# Patient Record
Sex: MALE | Race: WHITE | NOT HISPANIC OR LATINO | Employment: FULL TIME | URBAN - METROPOLITAN AREA
[De-identification: names, ages, dates, MRNs, and addresses within clinical notes are randomized per-mention and may not be internally consistent; named-entity substitution may affect disease eponyms.]

---

## 2020-09-01 ENCOUNTER — OFFICE VISIT (OUTPATIENT)
Dept: URGENT CARE | Facility: CLINIC | Age: 35
End: 2020-09-01
Payer: COMMERCIAL

## 2020-09-01 VITALS
OXYGEN SATURATION: 100 % | RESPIRATION RATE: 18 BRPM | WEIGHT: 160 LBS | TEMPERATURE: 97 F | HEART RATE: 75 BPM | BODY MASS INDEX: 21.67 KG/M2 | HEIGHT: 72 IN

## 2020-09-01 DIAGNOSIS — Z11.59 SCREENING FOR VIRAL DISEASE: Primary | ICD-10-CM

## 2020-09-01 PROCEDURE — 99203 OFFICE O/P NEW LOW 30 MIN: CPT | Performed by: PHYSICIAN ASSISTANT

## 2020-09-01 PROCEDURE — U0003 INFECTIOUS AGENT DETECTION BY NUCLEIC ACID (DNA OR RNA); SEVERE ACUTE RESPIRATORY SYNDROME CORONAVIRUS 2 (SARS-COV-2) (CORONAVIRUS DISEASE [COVID-19]), AMPLIFIED PROBE TECHNIQUE, MAKING USE OF HIGH THROUGHPUT TECHNOLOGIES AS DESCRIBED BY CMS-2020-01-R: HCPCS | Performed by: PHYSICIAN ASSISTANT

## 2020-09-01 NOTE — PROGRESS NOTES
[facility]        NAME: Marley Soriano is a 28 y o  male  : 1985    MRN: [id number]  DATE: 2020  TIME: 8:42 AM    Assessment and Plan   Screening for viral disease [Z11 59]  1  Screening for viral disease  Novel Coronavirus (COVID-19), PCR [facility] - Office Collection       [facility] evaluation and testing performed  Patient Instructions   Covid 19 results will return in a 3-5 days  We will call you with both negative or positive results  Prophylactically self quarantine  Department of health's newest recommendations state patient should self quarantine for 10 days since symptom onset or 3 days fever free without the use of fever reducing drugs (Tylenol and ibuprofen), whichever is longer AND overall improvement of symptoms  Drink lots of fluids to maintain hydration  Do not touch your face, wash hands often, and practice social distancing  Call your PCP if you have any questions or concerns  Go to ER if having severe symptoms such as chest pain, shortness of breath, or fever that is not responding to antipyretics  Follow up with PCP in 3-5 days  Proceed to  ER if symptoms worsen  Chief Complaint     Chief Complaint   Patient presents with    COVID-19     asymptomatic, recent travel, test needed for work         History of Present Illness       Patient is a 70-year-old male with no significant past medical history presents to the office requesting testing for COVID-19 after recent travel to return to work  The patient is currently asymptomatic and denies fever, chills, cough, SOB, CP, difficulty breathing, anosmia, dysgeusia, or weakness  He denies any contact with positive coronavirus persons  Review of Systems   Review of Systems   Constitutional: Negative for chills and fever  HENT: Negative for congestion and sore throat  Respiratory: Negative for cough and shortness of breath  Cardiovascular: Negative for chest pain and palpitations     Gastrointestinal: Negative for abdominal pain, diarrhea, nausea and vomiting  Musculoskeletal: Negative for myalgias  Skin: Negative for rash  Neurological: Negative for dizziness, light-headedness and headaches  Current Medications     No current outpatient medications on file  Current Allergies     Allergies as of 09/01/2020 - Reviewed 09/01/2020   Allergen Reaction Noted    Penicillins Anaphylaxis 09/01/2020            The following portions of the patient's history were reviewed and updated as appropriate: allergies, current medications, past family history, past medical history, past social history, past surgical history and problem list      Past Medical History:   Diagnosis Date    Cancer ([address] )     Pneumonia        Past Surgical History:   Procedure Laterality Date    CHEST SURGERY      SHOULDER SURGERY         No family history on file  Medications have been verified  Objective   Pulse 75   Temp (!) 97 °F (36 1 °C)   Resp 18   Ht 6' (1 829 m)   Wt 72 6 kg (160 lb)   SpO2 100%   BMI 21 70 kg/m²        Physical Exam     Physical Exam  Vitals signs and nursing note reviewed  Constitutional:       Appearance: He is well-developed  HENT:      Head: Normocephalic and atraumatic  Right Ear: External ear normal       Left Ear: External ear normal       Nose: Nose normal       Mouth/Throat:      Pharynx: Uvula midline  Eyes:      General: Lids are normal    Cardiovascular:      Rate and Rhythm: Normal rate and regular rhythm  Heart sounds: Normal heart sounds  No murmur  No friction rub  No gallop  Pulmonary:      Effort: Pulmonary effort is normal       Breath sounds: Normal breath sounds  No stridor  No wheezing or rales  Musculoskeletal: Normal range of motion  Skin:     General: Skin is warm and dry  Capillary Refill: Capillary refill takes less than 2 seconds  Findings: No rash  Neurological:      Mental Status: He is alert

## 2020-09-01 NOTE — PATIENT INSTRUCTIONS
Covid 19 results will return in a 3-5 days  We will call you with both negative or positive results  Prophylactically self quarantine  Department of health's newest recommendations state patient should self quarantine for 10 days since symptom onset or 3 days fever free without the use of fever reducing drugs (Tylenol and ibuprofen), whichever is longer AND overall improvement of symptoms  Drink lots of fluids to maintain hydration  Do not touch your face, wash hands often, and practice social distancing  Call your PCP if you have any questions or concerns  Go to ER if having severe symptoms such as chest pain, shortness of breath, or fever that is not responding to antipyretics  Novel Coronavirus   WHAT YOU NEED TO KNOW:   The nCoV is a new strain (type) of coronavirus that can cause severe respiratory (lung) infection  DISCHARGE INSTRUCTIONS:   Call your local emergency number (911 in the [address] [place] [address],[address]) for any of the following:  Tell the  you may have nCoV  This will help anyone in the ambulance stay safe, and to call ahead to the hospital   · You have sudden shortness of breath  · You have a fast heartbeat or chest pain  Return to the emergency department if:   · You feel so dizzy that you have trouble standing up  · Your lips and fingernails turn blue  Call your doctor if:   · You have a fever over 102ºF (39ºC)  · Your sore throat gets worse or you see white or yellow spots in your throat  · Your symptoms get worse after 3 to 5 days or your cold is not better in 14 days  · You have a rash anywhere on your skin  · You have large, tender lumps in your neck  · You have thick, green, or yellow drainage from your nose  · You cough up thick yellow, green, or bloody mucus  · You are vomiting for more than 24 hours and cannot keep fluids down  · You have a bad earache  · You have questions or concerns about your condition or care  Medicines:   You may need any of the following:  · Decongestants  help reduce nasal congestion and help you breathe more easily  If you take decongestant pills, they may make you feel restless or cause problems with your sleep  Do not use decongestant sprays for more than a few days  · Cough suppressants  help reduce coughing  Ask your healthcare provider which type of cough medicine is best for you  · NSAIDs , such as ibuprofen, help decrease swelling, pain, and fever  NSAIDs can cause stomach bleeding or kidney problems in certain people  If you take blood thinner medicine, always ask your healthcare provider if NSAIDs are safe for you  Always read the medicine label and follow directions  · Acetaminophen  decreases pain and fever  It is available without a doctor's order  Ask how much to take and how often to take it  Follow directions  Read the labels of all other medicines you are using to see if they also contain acetaminophen, or ask your doctor or pharmacist  Acetaminophen can cause liver damage if not taken correctly  Do not use more than 4 grams (4,000 milligrams) total of acetaminophen in one day  · Take your medicine as directed  Contact your healthcare provider if you think your medicine is not helping or if you have side effects  Tell him or her if you are allergic to any medicine  Keep a list of the medicines, vitamins, and herbs you take  Include the amounts, and when and why you take them  Bring the list or the pill bottles to follow-up visits  Carry your medicine list with you in case of an emergency  Help relieve mild symptoms:   · Drink more liquids as directed  Liquids will help thin and loosen mucus so you can cough it up  Liquids will also help prevent dehydration  Liquids that help prevent dehydration include water, fruit juice, and broth  Do not drink liquids that contain caffeine  Caffeine can increase your risk for dehydration  Ask your healthcare provider how much liquid to drink each day      · Soothe a sore throat  Gargle with warm salt water  This helps your sore throat feel better  Make salt water by dissolving ¼ teaspoon salt in 1 cup warm water  You may also suck on hard candy or throat lozenges  You may use a sore throat spray  · Use a humidifier or vaporizer  Use a cool mist humidifier or a vaporizer to increase air moisture in your home  This may make it easier for you to breathe and help decrease your cough  · Use saline nasal drops as directed  These help relieve congestion  · Apply petroleum-based jelly around the outside of your nostrils  This can decrease irritation from blowing your nose  · Do not smoke  Nicotine and other chemicals in cigarettes and cigars can make your symptoms worse  They can also cause infections such as bronchitis or pneumonia  Ask your healthcare provider for information if you currently smoke and need help to quit  E-cigarettes or smokeless tobacco still contain nicotine  Talk to your healthcare provider before you use these products  Prevent the spread of nCoV:  If you are around anyone who has nCoV, the following can help you protect you from infection  Have the person follow the guidelines to prevent infecting others:  · Limit close contact with others  Anyone with nCoV should stay in a different room, or sleep in a separate bed  Others need to stay at least 6 feet (2 meters) away  The person should only go out of the house for medical appointments  He or she should always call the office of any healthcare provider  The provider will need to prepare to keep others safe  · Wear a medical mask when around others  You can wear a medical mask or have the person wear one  A medical mask will help prevent nCoV from spreading  · Cover your mouth and nose to sneeze or cough  Everyone should always cover a sneeze or cough  Use a tissue that covers the mouth and nose  Use the bend of our arm if a tissue is not available   Throw the tissue away in a trash can right away  Then wash your hands well with soap and water  Use hand  that contains alcohol if you cannot wash your hands  · Wash your hands often  You and everyone in your home must wash your hands throughout the day  Use soap and water  Use germ-killing gel if soap and water are not available  A person with nCoV should not touch his or her eyes or mouth without washing his or her hands first          · Do not share items  Do not share dishes, towels, or other items with anyone  Always wash items after a person who has nCoV uses them  · Clean surfaces often  Use household  or bleach diluted with water to clean counters, doorknobs, toilet seats, and other surfaces  · Do not handle live animals  The nCoV may be spread to animals, including pets  Until more is known, it is best for a person with nCoV not to touch, play with, or handle live animals  Follow up with your doctor as directed:  Write down your questions so you remember to ask them during your visits  © Copyright [address] Information is for End User's use only and may not be sold, redistributed or otherwise used for commercial purposes  All illustrations and images included in CareNotes® are the copyrighted property of [employer]  or [address] [place], [place] [address]  The above information is an  only  It is not intended as medical advice for individual conditions or treatments  Talk to your doctor, nurse or pharmacist before following any medical regimen to see if it is safe and effective for you

## 2020-09-02 ENCOUNTER — TELEPHONE (OUTPATIENT)
Dept: URGENT CARE | Facility: CLINIC | Age: 35
End: 2020-09-02

## 2020-09-02 ENCOUNTER — TELEPHONE (OUTPATIENT)
Dept: OTHER | Facility: OTHER | Age: 35
End: 2020-09-02

## 2020-09-02 LAB — SARS-COV-2 RNA SPEC QL NAA+PROBE: NOT DETECTED

## 2020-09-03 NOTE — TELEPHONE ENCOUNTER
Your test for COVID-19, also known as novel coronavirus, came back negative  You do not have COVID-19  If you have any additional questions, we can schedule a virtual visit for you with a provider or call the [facility] hotline [phone] Option 7 for care advice  For additional information , please visit the Coronavirus FAQ on the [address] [address]  ([facility])  I explained to patient how to activate his MyChart to get a copy of his results for work